# Patient Record
Sex: FEMALE | Race: BLACK OR AFRICAN AMERICAN | Employment: FULL TIME | URBAN - METROPOLITAN AREA
[De-identification: names, ages, dates, MRNs, and addresses within clinical notes are randomized per-mention and may not be internally consistent; named-entity substitution may affect disease eponyms.]

---

## 2017-06-08 ENCOUNTER — HOSPITAL ENCOUNTER (OUTPATIENT)
Dept: ULTRASOUND IMAGING | Age: 57
Discharge: HOME | End: 2017-06-08
Attending: FAMILY MEDICINE
Payer: COMMERCIAL

## 2017-06-08 DIAGNOSIS — R10.9 ABDOMINAL PAIN: ICD-10-CM

## 2017-06-08 PROCEDURE — 76700 US EXAM ABDOM COMPLETE: CPT

## 2020-03-04 ENCOUNTER — HOSPITAL ENCOUNTER (OUTPATIENT)
Dept: MAMMOGRAPHY | Age: 60
Discharge: HOME | End: 2020-03-04
Attending: FAMILY MEDICINE
Payer: COMMERCIAL

## 2020-03-04 DIAGNOSIS — Z12.31 VISIT FOR SCREENING MAMMOGRAM: ICD-10-CM

## 2020-03-04 PROCEDURE — 77067 SCR MAMMO BI INCL CAD: CPT

## 2020-09-28 ENCOUNTER — HOSPITAL ENCOUNTER (OUTPATIENT)
Dept: MAMMOGRAPHY | Age: 60
Discharge: HOME | End: 2020-09-28
Attending: FAMILY MEDICINE
Payer: COMMERCIAL

## 2020-09-28 ENCOUNTER — HOSPITAL ENCOUNTER (OUTPATIENT)
Dept: ULTRASOUND IMAGING | Age: 60
Discharge: HOME | End: 2020-09-28
Attending: FAMILY MEDICINE
Payer: COMMERCIAL

## 2020-09-28 DIAGNOSIS — R92.8 ABNORMALITY OF LEFT BREAST ON SCREENING MAMMOGRAM: ICD-10-CM

## 2020-09-28 DIAGNOSIS — R92.8 ABNORMAL MAMMOGRAM OF LEFT BREAST: ICD-10-CM

## 2020-09-28 PROCEDURE — 76642 ULTRASOUND BREAST LIMITED: CPT

## 2020-09-28 PROCEDURE — 77065 DX MAMMO INCL CAD UNI: CPT

## 2020-10-05 NOTE — PROGRESS NOTES
HISTORY OF PRESENT ILLNESS  Diego Giang is a 61 y.o. female. HPI NEW Patient presents for consultation at the request of Dr. Aleksandra Cavazos for LEFT breast mass. She has no abnormal breast symptoms to report. Abnormal finding on mammogram. No history of breast biopsies. Her sister is here with her today. Family history-  Mother diagnosed with breast cancer in her 52's. Recurred and had a mastectomy. Still living. Breast imaging-  [facility] Results (most recent):  Results from Hospital Encounter encounter on 09/28/20   [facility] MAMMO LT DX INCL CAD    Narrative INDICATION:  Focal asymmetry on Screening Mammogram    EXAMS: Left Diagnostic Mammogram and Breast Sonogram    COMPARISON: 3/4/2020. UNILATERAL DIAGNOSTIC DIGITAL MAMMOGRAPHY of the left breast with CAD is  performed to evaluate focal asymmetry seen on screening mammogram. Dedicated   imaging today of the area of mammographic concern shows persistent  noncircumscribed noncalcified knee nodule seen only on CC view, anterior depth. Breast Composition: Heterogeneously dense. BREAST SONOGRAPHY of the anterior medial aspect of the left breast shows a mass  in the 9:00 location 2 cm from nipple measuring 0.9 x 0.8 x 0.3 cm. Impression IMPRESSION:  1. BI-RADS Assessment Category 4: Suspicious abnormality -left breast mass. 2. Ultrasound-guided core needle biopsy is recommended and this has been  discussed with the patient and called to the referring office. Patient to see  Dr. Lupe Martin, Wednesday, October 7.      Past Medical History:   Diagnosis Date    Chest pain, unspecified 4/4/2011    Essential hypertension, benign 4/4/2011    Hypertension     Insomnia     Migraines     Other and unspecified hyperlipidemia 4/4/2011     Past Surgical History:   Procedure Laterality Date    HX GYN       Social History     Socioeconomic History    Marital status:      Spouse name: Not on file    Number of children: Not on file    Years of education: Not on file    Highest education level: Not on file   Occupational History    Not on file   Social Needs    Financial resource strain: Not on file    Food insecurity     Worry: Not on file     Inability: Not on file    Transportation needs     Medical: Not on file     Non-medical: Not on file   Tobacco Use    Smoking status: Never Smoker    Smokeless tobacco: Never Used   Substance and Sexual Activity    Alcohol use: No    Drug use: No    Sexual activity: Not on file   Lifestyle    Physical activity     Days per week: Not on file     Minutes per session: Not on file    Stress: Not on file   Relationships    Social connections     Talks on phone: Not on file     Gets together: Not on file     Attends Tenriism service: Not on file     Active member of club or organization: Not on file     Attends meetings of clubs or organizations: Not on file     Relationship status: Not on file    Intimate partner violence     Fear of current or ex partner: Not on file     Emotionally abused: Not on file     Physically abused: Not on file     Forced sexual activity: Not on file   Other Topics Concern    Not on file   Social History Narrative    Not on file     OB History    No obstetric history on file. Obstetric Comments   Menarche 16, LMP 22, # of children 1, age of 4st delivery 25, Hysterectomy/oophorectomy YES/ONE, Breast bx NO, history of breast feeding NO, BCP YES, Hormone therapy NO             Current Outpatient Medications:     meloxicam (MOBIC) 15 mg tablet, Take 15 mg by mouth daily. , Disp: , Rfl:     valsartan-hydrochlorothiazide (DIOVAN HCT) 160-12.5 mg per tablet, Take 1 Tab by mouth daily. , Disp: , Rfl:     VITAMIN B COMP W-C (VITAMIN B COMPLEX-C PO), Take  by mouth daily. , Disp: , Rfl:     VITAMIN E ACETATE (VITAMIN E PO), Take  by mouth daily. , Disp: , Rfl:     zolpidem (AMBIEN) 10 mg tablet, , Disp: , Rfl:     amitriptyline (ELAVIL) 50 mg tablet, Take 1 Tab by mouth nightly. , Disp: 30 Tab, Rfl: 1    amitriptyline (ELAVIL) 25 mg tablet, Take 1 Tab by mouth nightly., Disp: 30 Tab, Rfl: 5    diclofenac EC (VOLTAREN) 75 mg EC tablet, Take  by mouth daily. , Disp: , Rfl:   Allergies   Allergen Reactions    Other Medication Unknown (comments)     Sinus pill otc     Review of Systems   Constitutional: Negative. HENT: Negative. Eyes: Negative. Respiratory: Negative. Cardiovascular: Negative. Gastrointestinal: Negative. Genitourinary: Negative. Musculoskeletal: Positive for joint pain. Skin: Negative. Neurological: Negative. Endo/Heme/Allergies: Negative. Psychiatric/Behavioral: Negative. All other systems reviewed and are negative. Physical Exam  Vitals signs and nursing note reviewed. Constitutional:       Appearance: She is well-developed. HENT:      Head: Normocephalic. Neck:      Thyroid: No thyromegaly. Pulmonary:      Effort: Pulmonary effort is normal.   Chest:      Breasts: Breasts are symmetrical.         Right: No inverted nipple, mass, nipple discharge, skin change or tenderness. Left: No inverted nipple, mass, nipple discharge, skin change or tenderness. Abdominal:      Palpations: Abdomen is soft. Musculoskeletal: Normal range of motion. Lymphadenopathy:      Cervical: No cervical adenopathy. Skin:     General: Skin is warm and dry. Neurological:      Mental Status: She is alert and oriented to person, place, and time. US - Guided Core Biopsy  Indication : Palpable mass Left  breast 9:00 behind areola   Ultrasound Findings: 1 cm hypoechoic elongated mass   Prep : alcohol. Anesthesia : 1% lidocaine with epinephrine, 10 cc. Device : The YABUY marquee device was advanced through the lesion and captured tissue with real-time Ultrasound Confirmation. Core Sampling :  3 cores were obtained. Marker:hydromark  Dressing : Steristrips, gauze and tape. Instructions : Remove gauze this evening.   Remove steristrips in one week. [address] [facility] [place] MAIN OFFICE [address] [facility]  OFFICE PROCEDURE PROGRESS NOTE        Chart reviewed for the following:   Pinky Oquendo MD, have reviewed the History, Physical and updated the Allergic reactions for UlDoir Monteromaty Ledezma 49 performed immediately prior to start of procedure:   Pinky Oquendo MD, have performed the following reviews on Jw Moreira prior to the start of the procedure:            * Patient was identified by name and date of birth   * Agreement on procedure being performed was verified  * Risks and Benefits explained to the patient  * Procedure site verified and marked as necessary  * Patient was positioned for comfort  * Consent was signed and verified     Time: 8:50 am       Date of procedure: 10/7/2020    Procedure performed by:  Nida Lama MD    Provider assisted by: Melvin Frank RN    Patient assisted by: self    How tolerated by patient: tolerated the procedure well with no complications    Post Procedural Pain Scale: 0 - No Hurt    Comments: none          ASSESSMENT and PLAN    ICD-10-CM ICD-9-CM    1. Left breast mass  N63.20 611.72      - s/p core biopsy. She tolerated well. Will call her with results  Risk not elevated. Gave her info for genetic testing.

## 2020-10-07 ENCOUNTER — OFFICE VISIT (OUTPATIENT)
Dept: SURGERY | Age: 60
End: 2020-10-07
Payer: COMMERCIAL

## 2020-10-07 VITALS
HEIGHT: 66 IN | HEART RATE: 79 BPM | SYSTOLIC BLOOD PRESSURE: 113 MMHG | DIASTOLIC BLOOD PRESSURE: 77 MMHG | BODY MASS INDEX: 30.7 KG/M2 | TEMPERATURE: 97 F | WEIGHT: 191 LBS

## 2020-10-07 DIAGNOSIS — N63.20 LEFT BREAST MASS: Primary | ICD-10-CM

## 2020-10-07 DIAGNOSIS — R92.8 ABNORMAL ULTRASOUND OF BREAST: Primary | ICD-10-CM

## 2020-10-07 PROCEDURE — 99242 OFF/OP CONSLTJ NEW/EST SF 20: CPT | Performed by: SURGERY

## 2020-10-07 PROCEDURE — 19083 BX BREAST 1ST LESION US IMAG: CPT | Performed by: SURGERY

## 2020-10-07 RX ORDER — MELOXICAM 15 MG/1
15 TABLET ORAL DAILY
COMMUNITY

## 2020-10-07 NOTE — LETTER
10/7/20 Patient: Devin Howell YOB: 1960 Date of Visit: 10/7/2020 Jimbo Avendaño MD 
[address] [address] [address] VIA Facsimile: [phone] Dear Jimbo Aevndaño MD, Thank you for referring Ms. Lawson to [address] MAIN OFFICE [address] for evaluation. My notes for this consultation are attached. If you have questions, please do not hesitate to call me. I look forward to following your patient along with you. Sincerely, Hiwot Abdi MD

## 2020-10-07 NOTE — PATIENT INSTRUCTIONS

## 2020-10-09 ENCOUNTER — DOCUMENTATION ONLY (OUTPATIENT)
Dept: SURGERY | Age: 60
End: 2020-10-09

## 2020-10-09 NOTE — PROGRESS NOTES
The patient called requesting a work note be faxed to her sister's job stating she accompanied the patient to the office visit. Sister's name Jayant Escobar, fax number [phone]. This was completed with confirmation.

## 2020-10-13 ENCOUNTER — TELEPHONE (OUTPATIENT)
Dept: SURGERY | Age: 60
End: 2020-10-13

## 2020-10-13 ENCOUNTER — DOCUMENTATION ONLY (OUTPATIENT)
Dept: SURGERY | Age: 60
End: 2020-10-13

## 2020-11-18 ENCOUNTER — DOCUMENTATION ONLY (OUTPATIENT)
Dept: SURGERY | Age: 60
End: 2020-11-18

## 2020-11-18 NOTE — PROGRESS NOTES
Per [employer], \"patient does not appear to meet any society guidelines or other miscellaneous criteria for hereditary cancer testing. \"

## 2023-05-12 RX ORDER — MELOXICAM 15 MG/1
15 TABLET ORAL DAILY
COMMUNITY

## 2023-05-12 RX ORDER — ZOLPIDEM TARTRATE 10 MG/1
TABLET ORAL
COMMUNITY
Start: 2011-09-02

## 2023-05-12 RX ORDER — VALSARTAN AND HYDROCHLOROTHIAZIDE 160; 12.5 MG/1; MG/1
1 TABLET, FILM COATED ORAL DAILY
COMMUNITY

## 2023-05-12 RX ORDER — AMITRIPTYLINE HYDROCHLORIDE 50 MG/1
TABLET, FILM COATED ORAL
COMMUNITY
Start: 2011-10-17

## 2023-05-12 RX ORDER — DICLOFENAC SODIUM 75 MG/1
TABLET, DELAYED RELEASE ORAL DAILY
COMMUNITY
Start: 2011-04-04

## 2023-05-12 RX ORDER — AMITRIPTYLINE HYDROCHLORIDE 25 MG/1
TABLET, FILM COATED ORAL
COMMUNITY
Start: 2011-04-18